# Patient Record
Sex: MALE | Race: WHITE | NOT HISPANIC OR LATINO | Employment: UNEMPLOYED
[De-identification: names, ages, dates, MRNs, and addresses within clinical notes are randomized per-mention and may not be internally consistent; named-entity substitution may affect disease eponyms.]

---

## 2017-05-31 ENCOUNTER — HOSPITAL (OUTPATIENT)
Dept: OTHER | Age: 32
End: 2017-05-31
Attending: EMERGENCY MEDICINE

## 2018-09-16 NOTE — ED NOTES
Plan of care discussed with pt's parents. Contact information verified with parents and added to the chart. Pt gave verbal consent for this RN to give his parents updates regarding future pt placement and care. Pt's parents went home for the night.

## 2018-09-16 NOTE — ED NOTES
[facility]- clinical faxed  Liam- no beds    Awaiting response from [place] [id number], [facility], and [facility]

## 2018-09-16 NOTE — ED NOTES
Report called to [facility]. Pt accepted to [facility] by Dr. Nathaniel Chandra. Pt going to room 216, Building 9[address], [address].

## 2018-09-16 NOTE — ED INITIAL ASSESSMENT (HPI)
Pt to ED tonight with c/o SI. Pt states to this RN \"I don't feel like living\". Pt reports that he is currently going through a divorce. His wife lives in [place] with his 2 children.   Pt states he has been living in [place] [facility] with his girl friend

## 2018-09-16 NOTE — ED PROVIDER NOTES
Patient Seen in: [facility] Emergency Department    History   Patient presents with:  Eval-P (psychiatric)    Stated Complaint: eval p    HPI    Patient is a 61-year-old male with a history of ADHD, anxiety, who is going through divorce, and has oth lymphadenopathy, TMs nml. Oropharynx nml. Mucus membranes moist.   Supple neck without any meningismus or rigidity.    Cardiovascular: Regular rhythm without murmurs rubs or gallops, no peripheral edema or JVD  Lungs: Speaking full sentences without any dis DIFFERENTIAL WITH PLATELET.   Procedure                               Abnormality         Status                     ---------                               -----------         ------                     CBC W/ DIFFERENTIAL[[id number]]          Abnormal

## 2018-09-16 NOTE — ED NOTES
[facility]- clinical faxed. They state it might be a while before the clinical is reviewed due to they are busy with other transfers.   [address] left message  [facility]- no beds  [address] - no beds  [facility]- no beds

## 2018-09-16 NOTE — ED NOTES
Pt accepted to [facility].  [phone]. Pt going to [address] [facility] in [facility].
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
## 2018-09-16 NOTE — ED NOTES
[facility]- clinical faxed    Awaiting response from: [place] [address], [facility], and [facility]

## 2018-10-18 ENCOUNTER — HOSPITAL ENCOUNTER (EMERGENCY)
Facility: HOSPITAL | Age: 33
Discharge: HOME | End: 2018-10-18
Attending: EMERGENCY MEDICINE
Payer: MEDICAID

## 2018-10-18 VITALS
BODY MASS INDEX: 23.86 KG/M2 | WEIGHT: 180 LBS | TEMPERATURE: 98 F | OXYGEN SATURATION: 98 % | HEART RATE: 51 BPM | DIASTOLIC BLOOD PRESSURE: 80 MMHG | HEIGHT: 73 IN | SYSTOLIC BLOOD PRESSURE: 131 MMHG | RESPIRATION RATE: 18 BRPM

## 2018-10-18 DIAGNOSIS — S61.011A LACERATION OF RIGHT THUMB WITHOUT FOREIGN BODY, NAIL DAMAGE STATUS UNSPECIFIED, INITIAL ENCOUNTER: Primary | ICD-10-CM

## 2018-10-18 PROCEDURE — 99283 EMERGENCY DEPT VISIT LOW MDM: CPT

## 2018-10-18 RX ORDER — CLINDAMYCIN HYDROCHLORIDE 300 MG/1
300 CAPSULE ORAL 3 TIMES DAILY
Qty: 21 CAPSULE | Refills: 0 | Status: SHIPPED | OUTPATIENT
Start: 2018-10-18 | End: 2018-10-25
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
## 2018-10-19 NOTE — ED PROVIDER NOTES
Patient Seen in: [facility] Emergency Department    History   Patient presents with:  Laceration Abrasion (integumentary)    Stated Complaint: R THUMB LAC HAPPENED YESTERDAY    HPI    Patient is a 21-year-old right-hand-dominant male who presents em thumb with no evidence of any foreign body, surrounding erythema, or bleeding appreciated. There is no focal bony tenderness to palpation throughout the right thumb. NEURO: Patient is awake, alert and oriented to time place and person.  Motor strength is

## 2018-10-19 NOTE — ED NOTES
Pt here for finger laceration. Pt states he was washing dishes last night and cut his right thumb on a knife. No active bleeding at this time. +2cm curved lac to right thumb. Tetanus ~5 years ago. No recent illness or fevers.  Well appearing male on arrival

## 2019-09-22 NOTE — ED PROVIDER NOTES
Patient Seen in: [facility] Emergency Department      History   Patient presents with:  Eval-D (detox)    Stated Complaint: overdose, heroin      HPI    Patient is a 81-year-old male with a history of heroin abuse in the past, who states that he had Wt 79.4 kg   SpO2 98%   BMI 23.09 kg/m²         Physical Exam    General: Comfortable and well appearing. Alert and oriented in no distress. Neuro: No focal neurologic deficits. No facial droop or slurred speech.   Grossly normal and symmetric motor streng ALCOHOL - Normal   CBC WITH DIFFERENTIAL WITH PLATELET    Narrative: The following orders were created for panel order CBC WITH DIFFERENTIAL WITH PLATELET.   Procedure                               Abnormality         Status                     -------- discharged home in stable condition.               Disposition and Plan     Clinical Impression:  Accidental overdose of heroin, initial encounter [facility])  (primary encounter diagnosis)    Disposition:  Discharge  9/22/2019  1:54 am    Follow-up:  See [facility] ref

## 2019-09-22 NOTE — ED INITIAL ASSESSMENT (HPI)
Pt to ER via EMS s/p heroin overdose. Per medics patient received IN narcan and was still not responding, when medics arrived he received 1 mg IV Narcan and became responsive.  Patient denies any suicidal ideation but does report wanting help with his drug

## 2019-09-22 NOTE — ED INITIAL ASSESSMENT (HPI)
Pt here for making statement of suicidal ideation. Pt is yelling and screaming and acting out of control. Police are at the bedside. MD at bedside.

## 2019-09-22 NOTE — BH LEVEL OF CARE ASSESSMENT
Level of Care Assessment Note    General Questions  Why are you here?: \"I overdosed. \"  Precipitating Events: Pt reported overdosing on heroine by accident. Pt reported that he has not used that much heroine in a long time.  He reported he wants to get sade assessment.)    Danger to Others/Property  Have you harmed someone or had thoughts about wanting someone harmed or killed in the past 30 days?: No  Have you harmed someone or had thoughts about wanting someone harmed or killed further back than 30 days?: N Current/Previous MH/CD Providers  Hospitalizations, Placements, Therapy, Detox: ([facility]- Pt refused assessment.)                          Current/Previous MH/CD Treatment  Recovery Support Groups: ([facility]- Pt refused assessment.)  History of Seclusion/Rest assessment.)  History of Gang Involvement: ([facility]- Pt refused assessment.)  Type of Residence: ([facility]- Pt refused assessment.)    Abuse Assessment  Physical Abuse: Unable to assess  Verbal Abuse: Unable to assess  Sexual Abuse: Unable to assess  Neglect: Unabl the assessment but accepted referrals for residential chemical dependency treatment. Risk/Protective Factors  Risk Factors: Substance use or abuse; No current treatment  Protective Factors: 2 children    Motivational Stage of Change  Motivational Stage o

## 2019-09-22 NOTE — ED NOTES
I did provide the patient a boxed sandwich and apple juice. He is awake, alert and oriented without any signs of distress. There is no concern for aspiration. He is calm, cooperative and pleasant. He is agreeing to wait to see [facility] for detox.

## 2019-09-22 NOTE — ED NOTES
Patient is resting comfortably in the stretcher on his cell phone. He does remain on continuous cardiac monitoring and pulse oximetry. He is more awake at this time. There is no acute distress noted. Breathing is unlabored, even and regular.

## 2019-09-22 NOTE — ED NOTES
Public safety and RN to bedside. Patient ambulated to bathroom with steady gait. Patient updated on plan of care, states \"Adriana been here for three fucking hours what is the fucking hold up. Im just gonna bounce soon. \" Patient educated that he is not allow

## 2019-09-22 NOTE — ED PROVIDER NOTES
Patient Seen in: [facility] Emergency Department      History   Patient presents with:  Eval-P (psychiatric)    Stated Complaint:     HPI    Patient presents for psychiatric evaluation.   [facility] that atraumatic, pupils equal round and reactive to light, oropharynx clear, uvula midline. Neck: Supple. Cardiovascular: Regular rate and rhythm. Respiratory: Lungs clear to auscultation.   Abdomen: Soft, nontender, no rebound or guarding, normal active carlie assessment with Yoni Sung and began to escalate. He was given IV Haldol and Ativan. He has been sedated and calm since that time. MDM     The patient has been petitioned and certified for involuntary admission.   He is medically clear and awaiting art

## 2019-09-22 NOTE — ED NOTES
Report received from [facility]. Patient care assumed at this time. Per report, Pt is in Seclusion. Pt received sleeping on cart, wearing hospital gown, own jeans, socks and shoes and the rest of his belongings at bedside.

## 2019-09-22 NOTE — ED NOTES
0145 - Pt easily awakened to verbal stimuli. Pt still on continuous cardiac monitor and pulse ox. Pt denies SI/HI at this time. Pt's Seclusion is discontinued and this was approved by Dr. Ale Munoz.  Awaits referrals from [address] [place] [address] and Pt will be discharged, per

## 2019-09-23 NOTE — BH LEVEL OF CARE ASSESSMENT
Level of Care Assessment Note    General Questions  Why are you here?: I was tricked into coming here by my parents. They were concern  that i had overdosed on heroin the night before and had to go to the emergency room and be narcaned.  I thought they were Police and medics were called and escorted the patient here. The patient is still very agitated stating that he does not need to be here and he was just trying to go to the train to get out of town. He denies suicidal ideations at this time.   He denies a maybe it would not be so bad if I  this life sucks maybe the after life would be better  Family History or Personal Lived Experience of Loss or Near Loss by Suicide: Yes  Describe loss(es): 4 years ago friend hung himself    Danger to Others/Property Flashbacks; Avoidance of reminders of past trauma; Hypervigilance(I think I have PTSD)  Bipolar Symptoms: Flight of ideas;Irritability;Rapid cycling  Bipolar Description: I have ADHD  Sleep Pattern: (Unable to assess- pt refused to answer questions. )  [facility]  Recovery Support Groups: Has a sponsor([facility])  History of Seclusion/Restraint: Yes    Alcohol Use  How often do you have a drink containing alcohol? : 2-4 times per month  Alcohol Use  Age at first use?: age13  Route: Oral  Average amount used? : 0 concerned about any of the following behaviors over the past 30 days?: (Unable to assess- pt refused to answer questions. )                                              Functional Impairment  Currently Attending School: No  Employment Status: Employed  Job Organized  Content: Ordinary  Level of Consciousness: Alert  Level of Consciousness: Alert  Behavior  Exhibited behavior: Sleeping    Assessment Summary  Assessment Summary: Pt is a 30year old male who lives at home with his parents.  Patient was in the ED not take any medications and currently have no outpt providers. I have a full time job and pay my bills and the only legal issues is my ex wife keeps taking me to court re custody and child support payments. O have had depression and anxietyt.  but currentl Observation;Suicide  Medical Precautions: Seizure

## 2019-09-23 NOTE — ED NOTES
Bedside report given to LILA MAYER RN     Pt remains sleeping at this time. Will continue to monitor.

## 2019-09-23 NOTE — ED NOTES
Patient awake at this time, RN to bedside. Plan of care discussed with patient.  Patient becoming visibly upset and verbally aggressive with staff stating \"This is bullshit, Im not suicidal and I dont have suicidal ideations and you cant hold me here again

## 2019-09-23 NOTE — ED NOTES
[facility] is calling for nurse to nurse  The patient will go to [facility]  They will give accepting doctor information after report

## 2019-09-23 NOTE — ED NOTES
Psychiatry is out of the room  Patient pulled his monitor off   He was put back on the monitor and vitals were done

## 2019-09-23 NOTE — ED NOTES
Consulted with Dr. Nohemi Gee at 18:45 who recommended inpt tx. ER physician Dr. Sulema Burton and FRANCI Lara informed about plan of care. Insurance OON with [facility].

## 2019-09-23 NOTE — ED PROVIDER NOTES
No events during my shift. Patient awaiting placement by [facility] . He has been petitioned and certified by the previous physician.

## 2019-09-23 NOTE — ED NOTES
Report received from [place], RN     Pt sleeping abed at this time. No distress noted. Respiration equal and unlabored. VSS. Will continue to monitor.
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
## 2019-09-23 NOTE — ED NOTES
[facility] Patient care assumed at this time.

## 2019-09-23 NOTE — ED NOTES
At [address], patient became verbally aggressive towards staff yelling out \"Fuck you. I want fucking out of here, this is bull shit. Fly Rain been here for five fucking hours and none of yall give a fuck about me or anyone else here. \" RN and security to bedside.  R

## 2019-09-23 NOTE — ED NOTES
Received call from [facility], no beds currently. They will c/b later if something opens. [facility] called back, they had a bed, but then they had to give it away to someone in their ER. They will call back if they end up having a bed later today.

## 2019-09-23 NOTE — ED NOTES
[address] [facility] clinical and faxed packet. Per facility; charge RN to be contacted @ [phone].

## 2019-09-23 NOTE — ED NOTES
[address] [facility] declines. [place] will have discharges later today. Left VM at [facility] and faxed.

## 2019-09-23 NOTE — ED NOTES
Spoke with Bob Barrera at [facility] for possible transfer to [facility]. Pt sleeping on cart, remains on seclusion will continue to monitor.

## 2019-09-23 NOTE — ED NOTES
While doing vitals patient was sleeping  He woke up and demanded ativan  When asked why the patient fell back asleep  He then woke up and asked for ativan again  He stated \"in anxious\"  Patient fell back asleep RN was notified

## 2019-09-23 NOTE — ED NOTES
Transfer update:  *Called [facility] [employer], they still have pt's packet and have it in review at a few of their facilities including [address] [place] [address], [facility], and [facility].  Awaiting c/b once they know more about their d/c's, but likely [address] [place] [address] will have more av

## 2019-09-23 NOTE — ED NOTES
Packet faxed to the following facilities:     [facility]. [address] [facility]. [facility] in [place]. [facility] in [place]. [facility]. [facility].    [facility] [place]

## 2019-09-23 NOTE — ED NOTES
While medicating patient he is apologetic, stating that he is frustrated with the situation and is concerned that he will lose his job for a no call/no show.  Patient states that he had prior attempt of self-harm and he had to be hospitalized for that so he

## 2019-09-23 NOTE — ED NOTES
Patient ambulated to the bathroom with RN escort  Patient became upset and was raising his voice  He is upset about being admitted  RN is at the bedside to medicate

## 2019-09-23 NOTE — CONSULTS
[facility]  Psychiatric Consultation    Mily Fox YOB: 1985   Age/Gender 30year old male MRN [id number]   Location [address] [facility] [facility] [address] Attending Jordon Munoz MD   Hosp Day # 0 PCP None Pcp     Date of Serv patch Transdermal Daily     PRN:  haloperidol lactate, LORazepam, haloperidol **OR** haloperidol lactate **OR** haloperidol lactate, LORazepam **OR** LORazepam **OR** LORazepam **OR** LORazepam **OR** LORazepam **OR** LORazepam    Family History:   No fami as evidenced by patiets understanding of reason for being brought to ER p  Judgment: poor as evidenced by  Inability to contract for safety     Patient Strengths/Assets:  Patient's strengths include support from family  as evidenced by patient report . Number of Occurrences: 1      Ethyl Alcohol          Standing Status: Standing          Number of Occurrences: 1      Drug Screen 7 W/confirmation, urine Once          Standing Status: Standing          Number of Occurrences: 1      CBC With Differential W

## 2019-09-23 NOTE — ED NOTES
[facility] likely has a bed.  They are requesting updated P&C and med list. Sulma See working on new P&C and [facility] will fax over med list per their request.

## 2019-09-24 NOTE — BH PROGRESS NOTE
Fe Esqueda from [facility] called. Pt has been accepted by Dr. Alton Echevarria to [address]. Edw ER RN to call Nikhil KOROMA for N2N at [phone], and to set up transport. This writer will notify pt's RN. normal...

## 2019-09-24 NOTE — ED NOTES
Received a call from [facility]. They are able to accept pt once they get an EKG and an updated P&C. ED RN notified.  Attempted to call ISHA at [facility] - no answer

## 2019-09-24 NOTE — ED NOTES
[facility] - kamini's insurance is out of [facility] - gave clinical, faxed packet, awaiting response

## 2019-09-24 NOTE — ED PROVIDER NOTES
Patient has remained calm here. No agitation and no requirements for sedation. Still awaiting transfer for further psychiatric care.

## 2019-09-24 NOTE — ED NOTES
Pt states he hasn't taken any prescribed medications in over a year because he hasn't seen a doctor. Pt states he is not currently having any heroin withdraw symptoms. Pt resting on cart. Answering questions appropriately and denies SI at this time.

## 2019-09-24 NOTE — BH PROGRESS NOTE
This writer faxed EKG and updated P&C to Deon Ferrera on Liam's phone call to get accepting Dr/room # info, and to provide N2N #.

## 2019-09-24 NOTE — ED NOTES
RN report given to [employer], Klonopin medication handed over to Ruby Medina

## 2019-09-24 NOTE — ED NOTES
Pt given phone to call to speak with his parents. Security on standby at nurses station. Pt began yelling on the phone. Pt advised if he continues to yell the phone will be removed from the room. Pt agreed to not yell.  Pt pacing around in room while talkin

## 2019-09-24 NOTE — ED NOTES
Pt pacing around in the room states he is feeling anxious. Per Dr Uli Bello pt to have 1 mg of ativan PO at  This time.  Pt denies any other request.

## 2019-09-24 NOTE — ED NOTES
[facility] - left message  [facility] - no beds, no clinical given  [facility] - gave clinical, faxed packet, awaiting response

## 2019-09-25 NOTE — ED NOTES
EKG: Ventricular rate of 73. No acute ST-T wave changes. Axis/intervals are noted. Otherwise, agree with EKG report. sinus rhythm.

## 2020-03-19 NOTE — ED INITIAL ASSESSMENT (HPI)
Pt to ED with c/o SI. Patient sent text messages to friends and family stating he wanted to kill himself. Pt admits to using heroin to OD last night and states he did a lot of cocaine today in an attempt to hurt himself as well.  Pt denies CP or CLAUDIA

## 2020-03-20 NOTE — ED NOTES
[facility] deflected; no appropriate beds. Troy with [facility] called for N2N and this writer attempted to transfer to Pt's RN but she was with another pt. [facility] requested a call back for N2N at [phone]. ED RN Mikaela Rob updated.

## 2020-03-20 NOTE — ED NOTES
Spoke with Randy at [facility]. [facility] declined, sent packet to [facility] and they are looking into bed situation and will review if the have an open bed. [facility] faxed clinicals for review.

## 2020-03-20 NOTE — ED NOTES
Pt accepted at [facility] by Dr. Boy Ledbetter. Updated A pod team and pt awaiting transport at this time.

## 2020-03-20 NOTE — ED NOTES
Given Gatorade as per request.  King Aman to notify staff if wants something to eat. Verbalized understanding.

## 2020-03-20 NOTE — ED PROVIDER NOTES
Patient Seen in: [facility] Emergency Department      History   Patient presents with:  Eval-P    Stated Complaint: Eval p    HPI    27-year-old male presents to the emerge department stating that he is feeling suicidal.  He states he attempted to k Normocephalic and atraumatic. Neck:      Musculoskeletal: Normal range of motion and neck supple. Cardiovascular:      Rate and Rhythm: Normal rate and regular rhythm. Heart sounds: Normal heart sounds.    Pulmonary:      Effort: Pulmonary effort i DIFFERENTIAL WITH PLATELET    Narrative: The following orders were created for panel order CBC WITH DIFFERENTIAL WITH PLATELET.   Procedure                               Abnormality         Status                     ---------

## 2020-03-20 NOTE — BH LEVEL OF CARE ASSESSMENT
Level of Care Assessment Note    General Questions  Why are you here?: Pt is a 37 yr old male who arrived to the ER by ambulance d/t SI. Pt states \"I wasn't feeling like living so I tried to kill myself. \"  Precipitating Events: Pt states last night he tr Source  Referral Source: Friend/Relative  Referral Source Info: previous patient    Suicide Risk  Source of information for CSSR: Patient  In what setting is the screener performed?: in person  1.  Have you wished you were dead or wished you could go to sle Contact: No  Have you ever damaged/destroyed property or thought about it?: Yes  Describe Destructive Behavior Toward Property: pt denied / per previous assessment, pt reported hx of damaging property while intoxicated    Access to Means  [facility] Calculations  Weight: 180 lb  BMI (Calculated): 23.8  IBW LBS [place]: 184 LBS  IBW %: 97.83 %  IBW + 10%: 202.4 LBS  IBW - 10%: 165.6 LBS                                                                                                           Current/Previ above  Is your current use the most/worst it has ever been? : (telly; see above)         Cocaine Powder Use  Age at first use?: 20  Route: Snorted  Average current amount used? : every couple days goes through a half gram  How long with this pattern of use?: Complete ADLs  Do you have any prior/current legal concerns?: Arrest(s);DUI(arrested a year ago for drugs; 2 DUIs 4 yrs ago)  History of Gang Involvement: No  Type of Residence: Homeless    Abuse Assessment  Physical Abuse: Denies  Verbal Abuse: Denies  Se reports his depression worsened 2 months ago and denies trigger. Pt denied a trigger for his SI and states he doesn't know how long he's been having the suicidal thoughts.  Pt states he's been homeless for about a year and has been staying at friend's house Recurrent Episode  Depressive Disorder Recurrent Episode: Severe  Secondary Psychiatric Diagnoses  Substance Related and Addictive Disorders: Alcohol-Related Disorder - Alcohol use Disorder;Opioid-Related Disorder - Opioid use Disorder;Sedative, Hypnotic,

## 2020-08-08 NOTE — ED NOTES
This writer was watching Pt on room camera. Noticed Pt had phone. When this writer walked into room to take Pt phone Pt tried to hide phone under blanket. Reminded Pt that he is being watched on camera. Pt than pulled  out from behind pillow.  Phone

## 2020-08-08 NOTE — ED INITIAL ASSESSMENT (HPI)
36 yo M, hx of anxiety and depression, presents for suicidal ideation. Plans to \"overdose on drugs\". Had these thought for 3 days. Alcohol, cocaine, marijuana last night. Reports currently homeless. Last meal was 2 days ago.  Denies any respiratory sympto

## 2020-08-08 NOTE — ED PROVIDER NOTES
Patient Seen in: [facility] Emergency Department      History   Patient presents with:  Eval-P    Stated Complaint: eval p    HPI    Patient is a 79-year-old male who states he has been feeling suicidal.  Patient states he is thinking of trying an o and accommodation. Mouth normal, neck supple, no meningismus. LUNGS: Lungs clear to auscultation bilaterally. CARDIOVASCULAR: + S1-S2, regular rate and rhythm, no murmurs. BACK: No CVA tenderness, no midline bony tenderness.   ABDOMEN: + Bowel sounds, s Narrative: The following orders were created for panel order CBC WITH DIFFERENTIAL WITH PLATELET.   Procedure                               Abnormality         Status                     ---------                               -----------         ------

## 2020-08-09 NOTE — ED NOTES
Social Distancing Screener  1. Have you been practicing social distancing? yes    2. Have you been wearing a mask when in the community? yes    3. Who do you live with, and are they following social distancing and wearing a mask practice as well?  Homele

## 2020-08-09 NOTE — ED NOTES
Attempting to obtain placement for inpt psych. [facility]- no male beds    Packet faxed to [facility].

## 2020-08-09 NOTE — BH LEVEL OF CARE ASSESSMENT
Level of Care Assessment Note    General Questions  Why are you here?: \"I was having suicidal ideations. \"  Precipitating Events: Pt reported being depressed and suicidal ideation. He reported thoughts tell him to harm himself. He denied having a plan.  He with no specific plan but intent to act on suicidal thoughts.   Is your experience of thoughts of dying by suicide: A Solution to a Problem  Protective Factors: 2 kids  Past Suicidal Ideation: Rehersal/Research  Describe: \"Ended up doing something then sto night  Number of Sleep Hours: (3-4 night)  Use of Sleep Aids: Pt denied  Appetite Symptoms: Increased  Unplanned Weight Loss: No  Unplanned Weight Gain: No  History of Eating Disorder: No  Active Eating Disorder: No    IBW Calculations  Weight: 175 lb  BMI recovery?: No  Describe: homeless     Withdrawal Symptoms  History of Withdrawal Symptoms: Denies past symptoms  Last Withdrawal Episode: Pt denied  Current Withdrawal Symptoms: No  Breathalyzer: (40)    Compulsive Behaviors  Are you/others concerned about judgment as evidenced by: Pt reported a history of substance abuse.   Thought Patterns  Clarity/Relevance: Coherent  Flow: Organized  Content: Ordinary  Level of Consciousness: Alert  Level of Consciousness: Alert  Behavior  Exhibited behavior: Participated Depressive Disorder, Recurrent Episode           Pertinent Non-psychiatric Diagnoses: See medical                   SRAT Review  Behavioral Precautions: Suicide

## 2021-01-20 ENCOUNTER — HOSPITAL ENCOUNTER (OUTPATIENT)
Age: 36
Setting detail: OBSERVATION
Discharge: HOME | End: 2021-01-21
Attending: EMERGENCY MEDICINE | Admitting: INTERNAL MEDICINE

## 2021-01-20 DIAGNOSIS — R45.851 SUICIDAL IDEATION: ICD-10-CM

## 2021-01-20 DIAGNOSIS — F10.10 ALCOHOL ABUSE: Primary | ICD-10-CM

## 2021-01-20 DIAGNOSIS — F32.A DEPRESSION WITH SUICIDAL IDEATION: ICD-10-CM

## 2021-01-20 DIAGNOSIS — R45.851 DEPRESSION WITH SUICIDAL IDEATION: ICD-10-CM

## 2021-01-20 DIAGNOSIS — F14.10 COCAINE ABUSE (CMD): ICD-10-CM

## 2021-01-20 DIAGNOSIS — F41.9 ANXIETY AND DEPRESSION: ICD-10-CM

## 2021-01-20 DIAGNOSIS — E87.6 HYPOKALEMIA: ICD-10-CM

## 2021-01-20 DIAGNOSIS — F32.A ANXIETY AND DEPRESSION: ICD-10-CM

## 2021-01-20 LAB
AMPHETAMINES UR QL SCN>500 NG/ML: POSITIVE
ANION GAP SERPL CALC-SCNC: 8 MMOL/L (ref 10–20)
ATRIAL RATE (BPM): 82
BARBITURATES UR QL SCN>200 NG/ML: NEGATIVE
BASOPHILS # BLD: 0 K/MCL (ref 0–0.3)
BASOPHILS NFR BLD: 0 %
BENZODIAZ UR QL SCN>200 NG/ML: NEGATIVE
BUN SERPL-MCNC: 9 MG/DL (ref 6–20)
BUN/CREAT SERPL: 10 (ref 7–25)
BZE UR QL SCN>150 NG/ML: POSITIVE
CALCIUM SERPL-MCNC: 8.4 MG/DL (ref 8.4–10.2)
CANNABINOIDS UR QL SCN>50 NG/ML: POSITIVE
CHLORIDE SERPL-SCNC: 108 MMOL/L (ref 98–107)
CO2 SERPL-SCNC: 27 MMOL/L (ref 21–32)
CREAT SERPL-MCNC: 0.93 MG/DL (ref 0.67–1.17)
DEPRECATED RDW RBC: 43.4 FL (ref 39–50)
EOSINOPHIL # BLD: 0.2 K/MCL (ref 0–0.5)
EOSINOPHIL NFR BLD: 3 %
ERYTHROCYTE [DISTWIDTH] IN BLOOD: 12.7 % (ref 11–15)
ETHANOL SERPL-MCNC: 93 MG/DL
FASTING DURATION TIME PATIENT: ABNORMAL H
GFR SERPLBLD BASED ON 1.73 SQ M-ARVRAT: >90 ML/MIN/1.73M2
GLUCOSE SERPL-MCNC: 101 MG/DL (ref 65–99)
HCT VFR BLD CALC: 49.6 % (ref 39–51)
HGB BLD-MCNC: 16.2 G/DL (ref 13–17)
IMM GRANULOCYTES # BLD AUTO: 0 K/MCL (ref 0–0.2)
IMM GRANULOCYTES # BLD: 0 %
LYMPHOCYTES # BLD: 1.7 K/MCL (ref 1–4.8)
LYMPHOCYTES NFR BLD: 19 %
MAGNESIUM SERPL-MCNC: 2.3 MG/DL (ref 1.7–2.4)
MCH RBC QN AUTO: 30.1 PG (ref 26–34)
MCHC RBC AUTO-ENTMCNC: 32.7 G/DL (ref 32–36.5)
MCV RBC AUTO: 92.2 FL (ref 78–100)
MONOCYTES # BLD: 0.5 K/MCL (ref 0.3–0.9)
MONOCYTES NFR BLD: 6 %
NEUTROPHILS # BLD: 6.4 K/MCL (ref 1.8–7.7)
NEUTROPHILS NFR BLD: 72 %
NRBC BLD MANUAL-RTO: 0 /100 WBC
OPIATES UR QL SCN>300 NG/ML: NEGATIVE
P AXIS (DEGREES): 54
PCP UR QL SCN>25 NG/ML: NEGATIVE
PHOSPHATE SERPL-MCNC: 3.3 MG/DL (ref 2.4–4.7)
PLATELET # BLD AUTO: 274 K/MCL (ref 140–450)
POTASSIUM SERPL-SCNC: 3.1 MMOL/L (ref 3.4–5.1)
POTASSIUM SERPL-SCNC: 4.1 MMOL/L (ref 3.4–5.1)
PR-INTERVAL (MSEC): 148
QRS-INTERVAL (MSEC): 92
QT-INTERVAL (MSEC): 372
QTC: 434
R AXIS (DEGREES): 59
RAINBOW EXTRA TUBES HOLD SPECIMEN: NORMAL
RBC # BLD: 5.38 MIL/MCL (ref 4.5–5.9)
REPORT TEXT: NORMAL
SARS-COV-2 RNA RESP QL NAA+PROBE: NOT DETECTED
SERVICE CMNT-IMP: NORMAL
SERVICE CMNT-IMP: NORMAL
SODIUM SERPL-SCNC: 140 MMOL/L (ref 135–145)
T AXIS (DEGREES): 45
TROPONIN I SERPL HS-MCNC: <0.02 NG/ML
VENTRICULAR RATE EKG/MIN (BPM): 82
WBC # BLD: 9 K/MCL (ref 4.2–11)

## 2021-01-20 PROCEDURE — 85025 COMPLETE CBC W/AUTO DIFF WBC: CPT | Performed by: EMERGENCY MEDICINE

## 2021-01-20 PROCEDURE — 84484 ASSAY OF TROPONIN QUANT: CPT | Performed by: EMERGENCY MEDICINE

## 2021-01-20 PROCEDURE — 84100 ASSAY OF PHOSPHORUS: CPT | Performed by: INTERNAL MEDICINE

## 2021-01-20 PROCEDURE — 90792 PSYCH DIAG EVAL W/MED SRVCS: CPT | Performed by: PSYCHIATRY & NEUROLOGY

## 2021-01-20 PROCEDURE — C9803 HOPD COVID-19 SPEC COLLECT: HCPCS

## 2021-01-20 PROCEDURE — 80048 BASIC METABOLIC PNL TOTAL CA: CPT | Performed by: EMERGENCY MEDICINE

## 2021-01-20 PROCEDURE — G0378 HOSPITAL OBSERVATION PER HR: HCPCS

## 2021-01-20 PROCEDURE — 96361 HYDRATE IV INFUSION ADD-ON: CPT

## 2021-01-20 PROCEDURE — 96374 THER/PROPH/DIAG INJ IV PUSH: CPT

## 2021-01-20 PROCEDURE — 99285 EMERGENCY DEPT VISIT HI MDM: CPT

## 2021-01-20 PROCEDURE — 10002803 HB RX 637: Performed by: INTERNAL MEDICINE

## 2021-01-20 PROCEDURE — 10002803 HB RX 637: Performed by: EMERGENCY MEDICINE

## 2021-01-20 PROCEDURE — 10002800 HB RX 250 W HCPCS: Performed by: EMERGENCY MEDICINE

## 2021-01-20 PROCEDURE — 36415 COLL VENOUS BLD VENIPUNCTURE: CPT | Performed by: INTERNAL MEDICINE

## 2021-01-20 PROCEDURE — 84132 ASSAY OF SERUM POTASSIUM: CPT | Performed by: INTERNAL MEDICINE

## 2021-01-20 PROCEDURE — 80307 DRUG TEST PRSMV CHEM ANLYZR: CPT | Performed by: EMERGENCY MEDICINE

## 2021-01-20 PROCEDURE — 83735 ASSAY OF MAGNESIUM: CPT | Performed by: INTERNAL MEDICINE

## 2021-01-20 PROCEDURE — 90839 PSYTX CRISIS INITIAL 60 MIN: CPT

## 2021-01-20 PROCEDURE — 93005 ELECTROCARDIOGRAM TRACING: CPT | Performed by: EMERGENCY MEDICINE

## 2021-01-20 PROCEDURE — U0003 INFECTIOUS AGENT DETECTION BY NUCLEIC ACID (DNA OR RNA); SEVERE ACUTE RESPIRATORY SYNDROME CORONAVIRUS 2 (SARS-COV-2) (CORONAVIRUS DISEASE [COVID-19]), AMPLIFIED PROBE TECHNIQUE, MAKING USE OF HIGH THROUGHPUT TECHNOLOGIES AS DESCRIBED BY CMS-2020-01-R: HCPCS | Performed by: EMERGENCY MEDICINE

## 2021-01-20 PROCEDURE — 10002803 HB RX 637: Performed by: PSYCHIATRY & NEUROLOGY

## 2021-01-20 PROCEDURE — 10002807 HB RX 258: Performed by: EMERGENCY MEDICINE

## 2021-01-20 PROCEDURE — 99220 INITIAL OBSERVATION CARE,LEVL III: CPT | Performed by: INTERNAL MEDICINE

## 2021-01-20 PROCEDURE — 82077 ASSAY SPEC XCP UR&BREATH IA: CPT | Performed by: EMERGENCY MEDICINE

## 2021-01-20 PROCEDURE — 10002807 HB RX 258: Performed by: INTERNAL MEDICINE

## 2021-01-20 PROCEDURE — 87635 SARS-COV-2 COVID-19 AMP PRB: CPT | Performed by: EMERGENCY MEDICINE

## 2021-01-20 RX ORDER — NICOTINE 21 MG/24HR
1 PATCH, TRANSDERMAL 24 HOURS TRANSDERMAL ONCE
Status: COMPLETED | OUTPATIENT
Start: 2021-01-20 | End: 2021-01-21

## 2021-01-20 RX ORDER — ONDANSETRON 4 MG/1
4 TABLET, ORALLY DISINTEGRATING ORAL EVERY 12 HOURS PRN
Status: DISCONTINUED | OUTPATIENT
Start: 2021-01-20 | End: 2021-01-21 | Stop reason: HOSPADM

## 2021-01-20 RX ORDER — POTASSIUM CHLORIDE 20 MEQ/1
40 TABLET, EXTENDED RELEASE ORAL ONCE
Status: COMPLETED | OUTPATIENT
Start: 2021-01-20 | End: 2021-01-20

## 2021-01-20 RX ORDER — QUETIAPINE FUMARATE 25 MG/1
50 TABLET, FILM COATED ORAL 3 TIMES DAILY PRN
Status: DISCONTINUED | OUTPATIENT
Start: 2021-01-20 | End: 2021-01-21 | Stop reason: HOSPADM

## 2021-01-20 RX ORDER — LANOLIN ALCOHOL/MO/W.PET/CERES
100 CREAM (GRAM) TOPICAL ONCE
Status: COMPLETED | OUTPATIENT
Start: 2021-01-20 | End: 2021-01-20

## 2021-01-20 RX ORDER — MULTIVITAMIN,THER AND MINERALS
1 TABLET ORAL ONCE
Status: COMPLETED | OUTPATIENT
Start: 2021-01-20 | End: 2021-01-20

## 2021-01-20 RX ORDER — LORAZEPAM 2 MG/ML
3 INJECTION INTRAMUSCULAR
Status: DISCONTINUED | OUTPATIENT
Start: 2021-01-20 | End: 2021-01-21 | Stop reason: HOSPADM

## 2021-01-20 RX ORDER — LANOLIN ALCOHOL/MO/W.PET/CERES
100 CREAM (GRAM) TOPICAL DAILY
Status: DISCONTINUED | OUTPATIENT
Start: 2021-01-21 | End: 2021-01-21 | Stop reason: HOSPADM

## 2021-01-20 RX ORDER — SODIUM CHLORIDE 9 MG/ML
INJECTION, SOLUTION INTRAVENOUS CONTINUOUS
Status: DISCONTINUED | OUTPATIENT
Start: 2021-01-20 | End: 2021-01-21 | Stop reason: HOSPADM

## 2021-01-20 RX ORDER — FOLIC ACID 1 MG/1
1 TABLET ORAL ONCE
Status: COMPLETED | OUTPATIENT
Start: 2021-01-20 | End: 2021-01-20

## 2021-01-20 RX ORDER — MULTIVITAMIN,THER AND MINERALS
1 TABLET ORAL DAILY
Status: DISCONTINUED | OUTPATIENT
Start: 2021-01-21 | End: 2021-01-21 | Stop reason: HOSPADM

## 2021-01-20 RX ORDER — ONDANSETRON 2 MG/ML
4 INJECTION INTRAMUSCULAR; INTRAVENOUS EVERY 12 HOURS PRN
Status: DISCONTINUED | OUTPATIENT
Start: 2021-01-20 | End: 2021-01-21 | Stop reason: HOSPADM

## 2021-01-20 RX ORDER — LORAZEPAM 2 MG/ML
2 INJECTION INTRAMUSCULAR ONCE
Status: COMPLETED | OUTPATIENT
Start: 2021-01-20 | End: 2021-01-20

## 2021-01-20 RX ORDER — LORAZEPAM 2 MG/ML
4 INJECTION INTRAMUSCULAR
Status: DISCONTINUED | OUTPATIENT
Start: 2021-01-20 | End: 2021-01-21 | Stop reason: HOSPADM

## 2021-01-20 RX ORDER — LORAZEPAM 2 MG/ML
2 INJECTION INTRAMUSCULAR
Status: DISCONTINUED | OUTPATIENT
Start: 2021-01-20 | End: 2021-01-21 | Stop reason: HOSPADM

## 2021-01-20 RX ORDER — GABAPENTIN 300 MG/1
300 CAPSULE ORAL EVERY 8 HOURS SCHEDULED
Status: DISCONTINUED | OUTPATIENT
Start: 2021-01-20 | End: 2021-01-21 | Stop reason: HOSPADM

## 2021-01-20 RX ORDER — FOLIC ACID 1 MG/1
1 TABLET ORAL DAILY
Status: DISCONTINUED | OUTPATIENT
Start: 2021-01-21 | End: 2021-01-21 | Stop reason: HOSPADM

## 2021-01-20 RX ADMIN — SODIUM CHLORIDE 1000 ML: 9 INJECTION, SOLUTION INTRAVENOUS at 03:43

## 2021-01-20 RX ADMIN — SODIUM CHLORIDE: 0.9 INJECTION, SOLUTION INTRAVENOUS at 10:51

## 2021-01-20 RX ADMIN — GABAPENTIN 300 MG: 300 CAPSULE ORAL at 15:20

## 2021-01-20 RX ADMIN — TRAZODONE HYDROCHLORIDE 150 MG: 100 TABLET ORAL at 23:04

## 2021-01-20 RX ADMIN — QUETIAPINE 50 MG: 25 TABLET, FILM COATED ORAL at 15:20

## 2021-01-20 RX ADMIN — GABAPENTIN 300 MG: 300 CAPSULE ORAL at 23:04

## 2021-01-20 RX ADMIN — LORAZEPAM 2 MG: 2 INJECTION INTRAMUSCULAR; INTRAVENOUS at 03:44

## 2021-01-20 RX ADMIN — FOLIC ACID 1 MG: 1 TABLET ORAL at 03:44

## 2021-01-20 RX ADMIN — Medication 100 MG: at 03:43

## 2021-01-20 RX ADMIN — Medication 1 TABLET: at 03:43

## 2021-01-20 RX ADMIN — NICOTINE 1 PATCH: 21 PATCH TRANSDERMAL at 05:22

## 2021-01-20 RX ADMIN — POTASSIUM CHLORIDE 40 MEQ: 1500 TABLET, EXTENDED RELEASE ORAL at 10:47

## 2021-01-20 ASSESSMENT — LIFESTYLE VARIABLES
ANXIETY: NO ANXIETY, AT EASE
PAROXYSMAL SWEATS: NO SWEAT VISIBLE
PAROXYSMAL SWEATS: NO SWEAT VISIBLE
VISUAL DISTURBANCES: NOT PRESENT
AGITATION: NORMAL ACTIVITY
HEADACHE, FULLNESS IN HEAD: NOT PRESENT
TACTILE DISTURBANCES: NOT PRESENT
ANXIETY: NO ANXIETY, AT EASE
HOW MANY STANDARD DRINKS CONTAINING ALCOHOL DO YOU HAVE ON A TYPICAL DAY: 10 OR MORE
AGITATION: NORMAL ACTIVITY
PRIOR ALCOHOL TREATMENT: YES
TREMOR: NO TREMOR
TREMOR: NO TREMOR
TACTILE DISTURBANCES: NOT PRESENT
AGITATION: NORMAL ACTIVITY
PAROXYSMAL SWEATS: NO SWEAT VISIBLE
TACTILE DISTURBANCES: NOT PRESENT
TACTILE DISTURBANCES: NOT PRESENT
AUDITORY DISTURBANCES: NOT PRESENT
ALCOHOL_USE: YES
AGITATION: NORMAL ACTIVITY
NAUSEA AND VOMITING: NO NAUSEA AND NO VOMITING
HOW OFTEN DO YOU HAVE 6 OR MORE DRINKS ON ONE OCCASION: DAILY OR ALMOST DAILY
NAUSEA AND VOMITING: NO NAUSEA AND NO VOMITING
ALCOHOL_USE_STATUS: BLOOD ALCOHOL INDICATING ACUTE INTOXICATION GREATER THAN 0.08
HOW OFTEN DO YOU HAVE 6 OR MORE DRINKS ON ONE OCCASION: DAILY OR ALMOST DAILY
TREMOR: NOT VISIBLE, BUT CAN BE FELT FINGERTIP TO FINGERTIP
TACTILE DISTURBANCES: NOT PRESENT
AGITATION: NORMAL ACTIVITY
AUDITORY DISTURBANCES: NOT PRESENT
HOW MANY STANDARD DRINKS CONTAINING ALCOHOL DO YOU HAVE ON A TYPICAL DAY: 10 OR MORE
AUDITORY DISTURBANCES: NOT PRESENT
HEADACHE, FULLNESS IN HEAD: NOT PRESENT
HEADACHE, FULLNESS IN HEAD: NOT PRESENT
HOW OFTEN DO YOU HAVE A DRINK CONTAINING ALCOHOL: 4 OR MORE TIMES PER WEEK
AUDIT-C TOTAL SCORE: 12
VISUAL DISTURBANCES: NOT PRESENT
ANXIETY: NO ANXIETY, AT EASE
AGITATION: NORMAL ACTIVITY
ANXIETY: NO ANXIETY, AT EASE
AUDITORY DISTURBANCES: NOT PRESENT
TREMOR: NOT VISIBLE, BUT CAN BE FELT FINGERTIP TO FINGERTIP
HEADACHE, FULLNESS IN HEAD: VERY MILD
ALCOHOL_TYPE: HARD LIQUOR
LAST DRINK YOU HAD: 48 HOURS OR LESS
NAUSEA AND VOMITING: NO NAUSEA AND NO VOMITING
TREMOR: NOT VISIBLE, BUT CAN BE FELT FINGERTIP TO FINGERTIP
HEADACHE, FULLNESS IN HEAD: NOT PRESENT
NAUSEA AND VOMITING: NO NAUSEA AND NO VOMITING
NAUSEA AND VOMITING: NO NAUSEA AND NO VOMITING
TREMOR: NOT VISIBLE, BUT CAN BE FELT FINGERTIP TO FINGERTIP
ALCOHOL_USE_STATUS: UNHEALTHY DRINKING IDENTIFIED. AUDIT C: 3 OR MORE FOR WOMEN AND 4 OR MORE FOR MEN.
VISUAL DISTURBANCES: NOT PRESENT
ALCOHOL_ROUTE: PO
AUDITORY DISTURBANCES: NOT PRESENT
HEADACHE, FULLNESS IN HEAD: NOT PRESENT
VISUAL DISTURBANCES: NOT PRESENT
TACTILE DISTURBANCES: NOT PRESENT
ANXIETY: NO ANXIETY, AT EASE
PAROXYSMAL SWEATS: NO SWEAT VISIBLE
VISUAL DISTURBANCES: NOT PRESENT
HOW OFTEN DO YOU HAVE A DRINK CONTAINING ALCOHOL: 4 OR MORE TIMES PER WEEK
PAROXYSMAL SWEATS: NO SWEAT VISIBLE
AUDIT-C TOTAL SCORE: 12
AUDITORY DISTURBANCES: NOT PRESENT
HISTORY OF PROBLEMS WHEN YOU STOP DRINKING ALCOHOL: NO
NAUSEA AND VOMITING: NO NAUSEA AND NO VOMITING
PAROXYSMAL SWEATS: NO SWEAT VISIBLE
ANXIETY: NO ANXIETY, AT EASE
VISUAL DISTURBANCES: NOT PRESENT

## 2021-01-20 ASSESSMENT — PATIENT HEALTH QUESTIONNAIRE - PHQ9
IS PATIENT ABLE TO COMPLETE PHQ2 OR PHQ9: YES
6. FEELING BAD ABOUT YOURSELF - OR THAT YOU ARE A FAILURE OR HAVE LET YOURSELF OR YOUR FAMILY DOWN: NEARLY EVERY DAY
CLINICAL INTERPRETATION OF PHQ9 SCORE: SEVERE DEPRESSION
9. THOUGHTS THAT YOU WOULD BE BETTER OFF DEAD, OR OF HURTING YOURSELF: NEARLY EVERY DAY
CLINICAL INTERPRETATION OF PHQ9 SCORE: FURTHER SCREENING NEEDED
5. POOR APPETITE OR OVEREATING: NOT AT ALL
SUM OF ALL RESPONSES TO PHQ9 QUESTIONS 1 AND 2: 6
2. FEELING DOWN, DEPRESSED OR HOPELESS: NEARLY EVERY DAY
SUM OF ALL RESPONSES TO PHQ QUESTIONS 1-9: 24
SUM OF ALL RESPONSES TO PHQ9 QUESTIONS 1 TO 9: 24
4. FEELING TIRED OR HAVING LITTLE ENERGY: NEARLY EVERY DAY
SUM OF ALL RESPONSES TO PHQ9 QUESTIONS 1 AND 2: 6
CLINICAL INTERPRETATION OF PHQ2 SCORE: SEVERE DEPRESSION
10. IF YOU CHECKED OFF ANY PROBLEMS, HOW DIFFICULT HAVE THESE PROBLEMS MADE IT FOR YOU TO DO YOUR WORK, TAKE CARE OF THINGS AT HOME, OR GET ALONG WITH OTHER PEOPLE: EXTREMELY DIFFICULT
7. TROUBLE CONCENTRATING ON THINGS, SUCH AS READING THE NEWSPAPER OR WATCHING TELEVISION: NEARLY EVERY DAY
8. MOVING OR SPEAKING SO SLOWLY THAT OTHER PEOPLE COULD HAVE NOTICED. OR THE OPPOSITE, BEING SO FIGETY OR RESTLESS THAT YOU HAVE BEEN MOVING AROUND A LOT MORE THAN USUAL: NEARLY EVERY DAY
CLINICAL INTERPRETATION OF PHQ2 SCORE: FURTHER SCREENING NEEDED
1. LITTLE INTEREST OR PLEASURE IN DOING THINGS: NEARLY EVERY DAY
3. TROUBLE FALLING OR STAYING ASLEEP OR SLEEPING TOO MUCH: NEARLY EVERY DAY

## 2021-01-20 ASSESSMENT — COLUMBIA-SUICIDE SEVERITY RATING SCALE - C-SSRS
HOW LONG AGO DID YOU DO ANY OF THESE?: WITHIN THE LAST THREE MONTHS
1. WITHIN THE PAST MONTH, HAVE YOU WISHED YOU WERE DEAD OR WISHED YOU COULD GO TO SLEEP AND NOT WAKE UP?: YES
3. HAVE YOU BEEN THINKING ABOUT HOW YOU MIGHT KILL YOURSELF?: YES
5. HAVE YOU STARTED TO WORK OUT OR WORKED OUT THE DETAILS OF HOW TO KILL YOURSELF? DO YOU INTEND TO CARRY OUT THIS PLAN?: NO
2. HAVE YOU ACTUALLY HAD ANY THOUGHTS OF KILLING YOURSELF?: YES
IS THE PATIENT ABLE TO COMPLETE C-SSRS: YES
4. HAVE YOU HAD THESE THOUGHTS AND HAD SOME INTENTION OF ACTING ON THEM?: NO
6. HAVE YOU EVER DONE ANYTHING, STARTED TO DO ANYTHING, OR PREPARED TO DO ANYTHING TO END YOUR LIFE?: YES

## 2021-01-20 ASSESSMENT — ACTIVITIES OF DAILY LIVING (ADL)
RECENT_DECLINE_ADL: NO
ADL_SHORT_OF_BREATH: NO
ADL_SCORE: 12
ADL_BEFORE_ADMISSION: INDEPENDENT
CHRONIC_PAIN_PRESENT: NO

## 2021-01-20 ASSESSMENT — PAIN SCALES - GENERAL
PAINLEVEL_OUTOF10: 0
PAINLEVEL_OUTOF10: 0

## 2021-01-20 ASSESSMENT — COGNITIVE AND FUNCTIONAL STATUS - GENERAL
LEVEL_OF_CONSCIOUSNESS_CALCULATED: ALERT
BECAUSE OF A PHYSICAL, MENTAL, OR EMOTIONAL CONDITION, DO YOU HAVE SERIOUS DIFFICULTY CONCENTRATING, REMEMBERING OR MAKING DECISIONS: YES
ARE YOU BLIND OR DO YOU HAVE SERIOUS DIFFICULTY SEEING, EVEN WHEN WEARING GLASSES: NO
DO YOU HAVE DIFFICULTY DRESSING OR BATHING: NO
MOOD: FLAT;DEPRESSED
ORIENTATION: ORIENTED (PERSON/PLACE/TIME)
PERCEPTUAL_MISINTERPRETATIONS_HALLUCINATIONS: CLEAR REALITY BASED PERCEPTIONS
ATTENTION_CALCULATED: MAINTAINS ATTENTION
ARE YOU DEAF OR DO YOU HAVE SERIOUS DIFFICULTY  HEARING: NO
MOTOR_BEHAVIOR-AGITATION_CALCULATED: CALM AND PURPOSEFUL
DO YOU HAVE SERIOUS DIFFICULTY WALKING OR CLIMBING STAIRS: NO
SPEECH: WEAK VOICE;BREATHY VOICE
MOTOR_BEHAVIOR-RETARDATION_CALCULATED: DECREASED MOVEMENT
MEMORY: INTACT
AFFECT: WITHDRAWN;DEPRESSED;FLAT
BEHAVIOR: SUICIDAL/SUICIDAL IDEATION;POOR EYE CONTACT
BECAUSE OF A PHYSICAL, MENTAL, OR EMOTIONAL CONDITION, DO YOU HAVE DIFFICULTY DOING ERRANDS ALONE: NO

## 2021-01-21 VITALS
TEMPERATURE: 97.3 F | OXYGEN SATURATION: 99 % | DIASTOLIC BLOOD PRESSURE: 60 MMHG | WEIGHT: 202.82 LBS | HEART RATE: 68 BPM | SYSTOLIC BLOOD PRESSURE: 103 MMHG | RESPIRATION RATE: 16 BRPM | HEIGHT: 73 IN | BODY MASS INDEX: 26.88 KG/M2

## 2021-01-21 LAB
ALBUMIN SERPL-MCNC: 3.4 G/DL (ref 3.6–5.1)
ALBUMIN/GLOB SERPL: 1.2 {RATIO} (ref 1–2.4)
ALP SERPL-CCNC: 51 UNITS/L (ref 45–117)
ALT SERPL-CCNC: 39 UNITS/L
ANION GAP SERPL CALC-SCNC: 6 MMOL/L (ref 10–20)
AST SERPL-CCNC: 27 UNITS/L
BASOPHILS # BLD: 0 K/MCL (ref 0–0.3)
BASOPHILS NFR BLD: 1 %
BILIRUB SERPL-MCNC: 0.8 MG/DL (ref 0.2–1)
BUN SERPL-MCNC: 14 MG/DL (ref 6–20)
BUN/CREAT SERPL: 13 (ref 7–25)
CALCIUM SERPL-MCNC: 8.3 MG/DL (ref 8.4–10.2)
CHLORIDE SERPL-SCNC: 113 MMOL/L (ref 98–107)
CO2 SERPL-SCNC: 26 MMOL/L (ref 21–32)
CREAT SERPL-MCNC: 1.09 MG/DL (ref 0.67–1.17)
DEPRECATED RDW RBC: 45.1 FL (ref 39–50)
EOSINOPHIL # BLD: 0.2 K/MCL (ref 0–0.5)
EOSINOPHIL NFR BLD: 2 %
ERYTHROCYTE [DISTWIDTH] IN BLOOD: 12.9 % (ref 11–15)
FASTING DURATION TIME PATIENT: ABNORMAL H
GFR SERPLBLD BASED ON 1.73 SQ M-ARVRAT: 88 ML/MIN/1.73M2
GLOBULIN SER-MCNC: 2.9 G/DL (ref 2–4)
GLUCOSE SERPL-MCNC: 90 MG/DL (ref 65–99)
HCT VFR BLD CALC: 47.6 % (ref 39–51)
HGB BLD-MCNC: 15.2 G/DL (ref 13–17)
IMM GRANULOCYTES # BLD AUTO: 0 K/MCL (ref 0–0.2)
IMM GRANULOCYTES # BLD: 1 %
LYMPHOCYTES # BLD: 1.8 K/MCL (ref 1–4.8)
LYMPHOCYTES NFR BLD: 24 %
MCH RBC QN AUTO: 30.2 PG (ref 26–34)
MCHC RBC AUTO-ENTMCNC: 31.9 G/DL (ref 32–36.5)
MCV RBC AUTO: 94.6 FL (ref 78–100)
MONOCYTES # BLD: 0.5 K/MCL (ref 0.3–0.9)
MONOCYTES NFR BLD: 7 %
NEUTROPHILS # BLD: 5.1 K/MCL (ref 1.8–7.7)
NEUTROPHILS NFR BLD: 65 %
NRBC BLD MANUAL-RTO: 0 /100 WBC
PLATELET # BLD AUTO: 231 K/MCL (ref 140–450)
POTASSIUM SERPL-SCNC: 4.1 MMOL/L (ref 3.4–5.1)
PROT SERPL-MCNC: 6.3 G/DL (ref 6.4–8.2)
RBC # BLD: 5.03 MIL/MCL (ref 4.5–5.9)
SODIUM SERPL-SCNC: 141 MMOL/L (ref 135–145)
WBC # BLD: 7.7 K/MCL (ref 4.2–11)

## 2021-01-21 PROCEDURE — 36415 COLL VENOUS BLD VENIPUNCTURE: CPT | Performed by: INTERNAL MEDICINE

## 2021-01-21 PROCEDURE — 99214 OFFICE O/P EST MOD 30 MIN: CPT | Performed by: PSYCHIATRY & NEUROLOGY

## 2021-01-21 PROCEDURE — 99214 OFFICE O/P EST MOD 30 MIN: CPT | Performed by: INTERNAL MEDICINE

## 2021-01-21 PROCEDURE — 10002803 HB RX 637: Performed by: INTERNAL MEDICINE

## 2021-01-21 PROCEDURE — G0378 HOSPITAL OBSERVATION PER HR: HCPCS

## 2021-01-21 PROCEDURE — 10002803 HB RX 637: Performed by: PSYCHIATRY & NEUROLOGY

## 2021-01-21 PROCEDURE — 10002807 HB RX 258: Performed by: INTERNAL MEDICINE

## 2021-01-21 PROCEDURE — 85025 COMPLETE CBC W/AUTO DIFF WBC: CPT | Performed by: INTERNAL MEDICINE

## 2021-01-21 PROCEDURE — 80053 COMPREHEN METABOLIC PANEL: CPT | Performed by: INTERNAL MEDICINE

## 2021-01-21 RX ORDER — QUETIAPINE FUMARATE 100 MG/1
100 TABLET, FILM COATED ORAL 3 TIMES DAILY
Qty: 45 TABLET | Refills: 0 | Status: SHIPPED | OUTPATIENT
Start: 2021-01-21

## 2021-01-21 RX ORDER — QUETIAPINE FUMARATE 100 MG/1
100 TABLET, FILM COATED ORAL 3 TIMES DAILY
Status: DISCONTINUED | OUTPATIENT
Start: 2021-01-21 | End: 2021-01-21 | Stop reason: HOSPADM

## 2021-01-21 RX ADMIN — Medication 100 MG: at 08:14

## 2021-01-21 RX ADMIN — FOLIC ACID 1 MG: 1 TABLET ORAL at 08:14

## 2021-01-21 RX ADMIN — SODIUM CHLORIDE: 0.9 INJECTION, SOLUTION INTRAVENOUS at 00:43

## 2021-01-21 RX ADMIN — GABAPENTIN 300 MG: 300 CAPSULE ORAL at 14:05

## 2021-01-21 RX ADMIN — MULTIPLE VITAMINS W/ MINERALS TAB 1 TABLET: TAB at 08:14

## 2021-01-21 RX ADMIN — QUETIAPINE 50 MG: 25 TABLET, FILM COATED ORAL at 08:20

## 2021-01-21 RX ADMIN — QUETIAPINE 100 MG: 100 TABLET, FILM COATED ORAL at 15:23

## 2021-01-21 ASSESSMENT — LIFESTYLE VARIABLES
HEADACHE, FULLNESS IN HEAD: NOT PRESENT
NAUSEA AND VOMITING: NO NAUSEA AND NO VOMITING
NAUSEA AND VOMITING: NO NAUSEA AND NO VOMITING
PAROXYSMAL SWEATS: NO SWEAT VISIBLE
HEADACHE, FULLNESS IN HEAD: NOT PRESENT
PAROXYSMAL SWEATS: NO SWEAT VISIBLE
TREMOR: NO TREMOR
TACTILE DISTURBANCES: NOT PRESENT
ANXIETY: NO ANXIETY, AT EASE
TACTILE DISTURBANCES: NOT PRESENT
ANXIETY: NO ANXIETY, AT EASE
AUDITORY DISTURBANCES: NOT PRESENT
AUDITORY DISTURBANCES: NOT PRESENT
AGITATION: NORMAL ACTIVITY
PAROXYSMAL SWEATS: NO SWEAT VISIBLE
TREMOR: NO TREMOR
HEADACHE, FULLNESS IN HEAD: NOT PRESENT
VISUAL DISTURBANCES: NOT PRESENT
TACTILE DISTURBANCES: NOT PRESENT
AGITATION: NORMAL ACTIVITY
AGITATION: NORMAL ACTIVITY
ANXIETY: NO ANXIETY, AT EASE
VISUAL DISTURBANCES: NOT PRESENT
NAUSEA AND VOMITING: NO NAUSEA AND NO VOMITING
AUDITORY DISTURBANCES: NOT PRESENT
VISUAL DISTURBANCES: NOT PRESENT
TREMOR: NO TREMOR

## 2021-01-21 ASSESSMENT — PAIN SCALES - GENERAL: PAINLEVEL_OUTOF10: 0

## 2021-01-29 NOTE — BH LEVEL OF CARE ASSESSMENT
Crisis Evaluation Assessment    Bernadine Fox YOB: 1985   Age 31year old MRN [id number]   Location [address] [facility] Attending Kurt Zhang MD      Patient's legal sex: male  Patient identifies as: male  Patient's paola of acting on them? (past 30 days): Yes(Alex initially denied)  5a. Have you started to work out or worked out the details of how to kill yourself? (past 30 days): Yes  5b. Do you intend to carry out this plan? (past 30 days): No  6.  Have you ever done anyt a year ago. His girlfriend stated he used/overdosed  Mid 2020. Evangelina Pham reports that he smokes marijuana daily since the age of 7. He estimates that he is smoking 3.5 grams per day.    Evangelina Pham reports that in the past he \"used everything\"  But not for over a Abuse: Denies  Neglect: Denies  Does anyone say or do something to you that makes you feel unsafe?: Yes(will not elaborate)  Have You Ever Been Harmed by a Partner/Caregiver?: No  Health Concerns r/t Abuse: No  Possible Abuse Reportable to[de-identified] Not appropriat combat his mood. Homicidal ideation is denied. No evidence of psychosis.       Risk/Protective Factors  Protective Factors: girlfriendand parents    Level of Care Recommendations  Consulted with: Dr Christiana Nichole  Level of Care Recommendation: Inpatient A

## 2021-01-29 NOTE — ED INITIAL ASSESSMENT (HPI)
Patient arrives to ED visibly intoxicated, states \"I am very intoxicated. \" immediately upon entering room states he's going to leave and have a cigarette. Informed that we are a non-smoking facility.  He arrived stating he was suicidal but now states \"I

## 2021-01-29 NOTE — ED NOTES
Upon asking patient further about his statements of SI, patient admits that now that he's in the ED room he's trying to take back what he said on arrival.

## 2021-01-29 NOTE — ED PROVIDER NOTES
Patient Seen in: [facility] Emergency Department      History   Patient presents with:  Eval-P    Stated Complaint: suicidal/intoxicated, admits to marijuana use    HPI/Subjective:   HPI    30-year-old male presents with girlfriend reporting intoxic rhythm and no murmur   Abdomen: Soft and nontender. No abdominal masses. No peritoneal signs   Extremities: no edema, normal peripheral pulses   Neuro: Alert oriented and nonfocal   Psych: Calm, cooperative. Denies suicidal ideation to me at this time. noncompliant with medications presents after making suicidal statements to the triage nurse. Patient's girlfriend also reports suicidal ideation with a plan for drug overdose. Patient has a history of a previous intentional drug overdose.   Patient will b

## 2021-01-29 NOTE — ED NOTES
Patient is resting comfortably. Pt stating he wants to leave and starts yelling that we cannot keep him here. Pt is intoxicated. Denies SI security to bs.  Pt cooperative and agrees to stay

## 2021-01-30 NOTE — ED PROVIDER NOTES
Patient has been calm and cooperative throughout his ER stay. He was accepted at [facility]   Awaiting transport

## 2021-01-30 NOTE — ED NOTES
Packet sent:   [facility]     Do not refer to [facility]!      Once covid is resulted, it will be sent to the above referrals

## 2021-01-30 NOTE — ED NOTES
Advised by Lilliana Nurse, RN that Shayne Mendez has been accepted for admission to [address] and cancelled the bed request.

## (undated) NOTE — ED AVS SNAPSHOT
Mr. Merlinda Jett   MRN: [id number]    Department:  [facility] Emergency Department   Date of Visit:  9/21/2019           Disclosure     Insurance plans vary and the physician(s) referred by the ER may not be covered by your plan.  Please contact tell this physician (or your personal doctor if your instructions are to return to your personal doctor) about any new or lasting problems. The primary care or specialist physician will see patients referred from the [facility] Emergency Department.  Cortney Lomas

## (undated) NOTE — ED AVS SNAPSHOT
Mr. Tony Khan   MRN: [id number]    Department:  [facility] Emergency Department   Date of Visit:  10/18/2018           Disclosure     Insurance plans vary and the physician(s) referred by the ER may not be covered by your plan.  Please contact tell this physician (or your personal doctor if your instructions are to return to your personal doctor) about any new or lasting problems. The primary care or specialist physician will see patients referred from the [facility] Emergency Department.  Ash Hurst